# Patient Record
Sex: MALE | Race: WHITE
[De-identification: names, ages, dates, MRNs, and addresses within clinical notes are randomized per-mention and may not be internally consistent; named-entity substitution may affect disease eponyms.]

---

## 2018-11-08 ENCOUNTER — HOSPITAL ENCOUNTER (OUTPATIENT)
Dept: HOSPITAL 76 - DI | Age: 23
Discharge: HOME | End: 2018-11-08
Attending: FAMILY MEDICINE
Payer: COMMERCIAL

## 2018-11-08 DIAGNOSIS — M79.672: Primary | ICD-10-CM

## 2018-11-08 NOTE — MRI REPORT
Reason:  PAIN IN LEFT FOOT

Procedure Date:  11/08/2018   

Accession Number:  [id number] / [id number]                    

Procedure:  MRI - Foot LT W/O CPT Code:  

 

FULL RESULT:

 

 

EXAM:

LEFT MIDFOOT MRI WITHOUT CONTRAST

 

EXAM DATE: 11/8/2018 08:43 AM.

 

CLINICAL HISTORY: Plantar left midfoot pain.

 

COMPARISON: None.

 

TECHNIQUE: Multiplanar, multisequence T1-weighted and fluid-sensitive 

sequences of the midfoot without contrast. Other: None.

 

FINDINGS:

Bones: No fractures or subluxations. No marrow edema. No bone lesions.

 

Articular Cartilage: Unremarkable.

 

Ligaments: The visualized intertarsal, intermetatarsal, and 

tarsometatarsal ligaments are intact. This includes the Lisfranc 

ligament. The visualized collateral ligaments are intact.

 

Tendons: The flexor and extensor tendons are unremarkable.

 

Musculature: No edema or fatty atrophy.

 

Other: No effusions. The visualized portion of the tarsal tunnel is 

unremarkable. No intermetatarsal bursitis. The subcutaneous tissues are 

unremarkable.

IMPRESSION: No MRI abnormalities in the midfoot.

 

[facility] MUSCULOSKELETAL RADIOLOGY SECTION